# Patient Record
Sex: MALE | Race: WHITE | NOT HISPANIC OR LATINO | URBAN - METROPOLITAN AREA
[De-identification: names, ages, dates, MRNs, and addresses within clinical notes are randomized per-mention and may not be internally consistent; named-entity substitution may affect disease eponyms.]

---

## 2018-10-03 ENCOUNTER — ON CAMPUS - OUTPATIENT (AMBULATORY)
Dept: URBAN - METROPOLITAN AREA HOSPITAL 2 | Facility: HOSPITAL | Age: 78
End: 2018-10-03
Payer: MEDICARE

## 2018-10-03 VITALS
WEIGHT: 180 LBS | OXYGEN SATURATION: 99 % | HEART RATE: 93 BPM | OXYGEN SATURATION: 96 % | HEART RATE: 77 BPM | HEART RATE: 96 BPM | SYSTOLIC BLOOD PRESSURE: 110 MMHG | SYSTOLIC BLOOD PRESSURE: 161 MMHG | DIASTOLIC BLOOD PRESSURE: 75 MMHG | SYSTOLIC BLOOD PRESSURE: 135 MMHG | RESPIRATION RATE: 17 BRPM | DIASTOLIC BLOOD PRESSURE: 72 MMHG | DIASTOLIC BLOOD PRESSURE: 100 MMHG | SYSTOLIC BLOOD PRESSURE: 117 MMHG | OXYGEN SATURATION: 95 % | OXYGEN SATURATION: 100 % | RESPIRATION RATE: 20 BRPM | SYSTOLIC BLOOD PRESSURE: 151 MMHG | HEIGHT: 68 IN | SYSTOLIC BLOOD PRESSURE: 113 MMHG | HEART RATE: 99 BPM | DIASTOLIC BLOOD PRESSURE: 70 MMHG | TEMPERATURE: 97.6 F | DIASTOLIC BLOOD PRESSURE: 78 MMHG | DIASTOLIC BLOOD PRESSURE: 97 MMHG | RESPIRATION RATE: 18 BRPM | DIASTOLIC BLOOD PRESSURE: 89 MMHG | HEART RATE: 100 BPM | HEART RATE: 91 BPM | RESPIRATION RATE: 16 BRPM | SYSTOLIC BLOOD PRESSURE: 118 MMHG | SYSTOLIC BLOOD PRESSURE: 114 MMHG | DIASTOLIC BLOOD PRESSURE: 66 MMHG | SYSTOLIC BLOOD PRESSURE: 95 MMHG | HEART RATE: 76 BPM | DIASTOLIC BLOOD PRESSURE: 77 MMHG | HEART RATE: 85 BPM | DIASTOLIC BLOOD PRESSURE: 84 MMHG

## 2018-10-03 DIAGNOSIS — Z12.11 ENCOUNTER FOR SCREENING FOR MALIGNANT NEOPLASM OF COLON: ICD-10-CM

## 2018-10-03 DIAGNOSIS — K57.30 DIVERTICULOSIS OF LARGE INTESTINE WITHOUT PERFORATION OR ABS: ICD-10-CM

## 2018-10-03 PROCEDURE — G0121 COLON CA SCRN NOT HI RSK IND: HCPCS | Performed by: INTERNAL MEDICINE

## 2022-12-08 ENCOUNTER — TRANSCRIBE ORDERS (OUTPATIENT)
Dept: ADMINISTRATIVE | Facility: HOSPITAL | Age: 82
End: 2022-12-08

## 2022-12-08 DIAGNOSIS — I65.22 OCCLUSION AND STENOSIS OF LEFT CAROTID ARTERY: Primary | ICD-10-CM

## 2022-12-12 ENCOUNTER — APPOINTMENT (OUTPATIENT)
Dept: CARDIOLOGY | Facility: HOSPITAL | Age: 82
End: 2022-12-12

## 2022-12-13 ENCOUNTER — HOSPITAL ENCOUNTER (OUTPATIENT)
Dept: CARDIOLOGY | Facility: HOSPITAL | Age: 82
Discharge: HOME | End: 2022-12-13
Admitting: PSYCHIATRY & NEUROLOGY

## 2022-12-13 DIAGNOSIS — I65.22 OCCLUSION AND STENOSIS OF LEFT CAROTID ARTERY: ICD-10-CM

## 2022-12-13 LAB
BH CV LEFT CCA HIDDEN LRR: 1 CM/S
BH CV VAS CAROTID LEFT DISTAL STENT EDV: 12 CM/S
BH CV VAS CAROTID LEFT DISTAL STENT: 54 CM/S
BH CV VAS CAROTID LEFT DISTAL TO STENT EDV: 21 CM/S
BH CV VAS CAROTID LEFT DISTAL TO STENT: 71 CM/S
BH CV VAS CAROTID LEFT MID STENT EDV: 22 CM/S
BH CV VAS CAROTID LEFT MID STENT: 85 CM/S
BH CV VAS CAROTID LEFT PROXIMAL STENT EDV: 22 CM/S
BH CV VAS CAROTID LEFT PROXIMAL STENT: 86 CM/S
BH CV VAS CAROTID LEFT PROXIMAL TO STENT: 76 CM/S
BH CV VAS CAROTID LEFT STENT NATIVE VESSEL PROXIMAL ED: 19 CM/S
BH CV XLRA MEAS LEFT DIST CCA EDV: -22.4 CM/SEC
BH CV XLRA MEAS LEFT DIST CCA PSV: -86.4 CM/SEC
BH CV XLRA MEAS LEFT DIST ICA EDV: -16.4 CM/SEC
BH CV XLRA MEAS LEFT DIST ICA PSV: -71.1 CM/SEC
BH CV XLRA MEAS LEFT ICA/CCA RATIO: 0.94
BH CV XLRA MEAS LEFT MID CCA EDV: -18.6 CM/SEC
BH CV XLRA MEAS LEFT MID CCA PSV: -75.8 CM/SEC
BH CV XLRA MEAS LEFT MID ICA EDV: -19.9 CM/SEC
BH CV XLRA MEAS LEFT MID ICA PSV: -81.4 CM/SEC
BH CV XLRA MEAS LEFT PROX CCA EDV: 14.3 CM/SEC
BH CV XLRA MEAS LEFT PROX CCA PSV: 76.4 CM/SEC
BH CV XLRA MEAS LEFT PROX ECA PSV: -176 CM/SEC
BH CV XLRA MEAS LEFT PROX ICA EDV: -11.8 CM/SEC
BH CV XLRA MEAS LEFT PROX ICA PSV: -54 CM/SEC
BH CV XLRA MEAS LEFT PROX SCLA PSV: 103 CM/SEC
BH CV XLRA MEAS RIGHT DIST CCA EDV: 18 CM/SEC
BH CV XLRA MEAS RIGHT DIST CCA PSV: 67.1 CM/SEC
BH CV XLRA MEAS RIGHT DIST ICA EDV: -19.2 CM/SEC
BH CV XLRA MEAS RIGHT DIST ICA PSV: -68.6 CM/SEC
BH CV XLRA MEAS RIGHT ICA/CCA RATIO: -1.24
BH CV XLRA MEAS RIGHT PROX CCA EDV: 12.4 CM/SEC
BH CV XLRA MEAS RIGHT PROX CCA PSV: 64.6 CM/SEC
BH CV XLRA MEAS RIGHT PROX ECA PSV: -149 CM/SEC
BH CV XLRA MEAS RIGHT PROX ICA EDV: -15.9 CM/SEC
BH CV XLRA MEAS RIGHT PROX ICA PSV: -83.4 CM/SEC
BH CV XLRA MEAS RIGHT PROX SCLA PSV: 113 CM/SEC
BH CV XLRA MEAS RIGHT VERTEBRAL A EDV: -14.8 CM/SEC
BH CV XLRA MEAS RIGHT VERTEBRAL A PSV: -49.4 CM/SEC
BH CVPROX LEFT ICA HIDDEN LRR: 1 CM
MAXIMAL PREDICTED HEART RATE: 140 BPM
STRESS TARGET HR: 119 BPM

## 2022-12-13 PROCEDURE — 93880 EXTRACRANIAL BILAT STUDY: CPT

## 2025-04-23 ENCOUNTER — HOSPITAL ENCOUNTER (OUTPATIENT)
Facility: HOSPITAL | Age: 85
Discharge: HOME | End: 2025-04-23
Attending: EMERGENCY MEDICINE | Admitting: EMERGENCY MEDICINE
Payer: MEDICARE

## 2025-04-23 ENCOUNTER — APPOINTMENT (OUTPATIENT)
Dept: GENERAL RADIOLOGY | Facility: HOSPITAL | Age: 85
End: 2025-04-23
Payer: MEDICARE

## 2025-04-23 VITALS
BODY MASS INDEX: 26.92 KG/M2 | WEIGHT: 177.6 LBS | TEMPERATURE: 98.6 F | RESPIRATION RATE: 18 BRPM | DIASTOLIC BLOOD PRESSURE: 65 MMHG | HEART RATE: 79 BPM | OXYGEN SATURATION: 98 % | SYSTOLIC BLOOD PRESSURE: 112 MMHG | HEIGHT: 68 IN

## 2025-04-23 DIAGNOSIS — L03.114 CELLULITIS OF ARM, LEFT: Primary | ICD-10-CM

## 2025-04-23 PROCEDURE — 73110 X-RAY EXAM OF WRIST: CPT

## 2025-04-23 PROCEDURE — G0463 HOSPITAL OUTPT CLINIC VISIT: HCPCS | Performed by: NURSE PRACTITIONER

## 2025-04-23 PROCEDURE — 73090 X-RAY EXAM OF FOREARM: CPT

## 2025-04-23 PROCEDURE — 99203 OFFICE O/P NEW LOW 30 MIN: CPT | Performed by: NURSE PRACTITIONER

## 2025-04-23 RX ORDER — CLINDAMYCIN HYDROCHLORIDE 300 MG/1
300 CAPSULE ORAL 4 TIMES DAILY
Qty: 40 CAPSULE | Refills: 0 | Status: SHIPPED | OUTPATIENT
Start: 2025-04-23 | End: 2025-05-03

## 2025-04-23 NOTE — DISCHARGE INSTRUCTIONS
Call tomorrow  for a follow up appointment with your primary care for for reevaluation and further treatment.     Medications as prescribed.    Elevate your arm to help with swelling    If you develop increased fevers, increased swelling and redness you need to be reevaluated immediately.    Tylenol/Motrin as needed for pain/fevers    Make sure patient is drinking plenty of fluids.    Return for any new or worsening symptoms.      Voice recognition transcription technology was used for documentation on this chart.  Result there may be some typos and/or introduced into the chart that were overlooked during editing reviewing.

## 2025-04-23 NOTE — FSED PROVIDER NOTE
"Subjective   History of Present Illness  The patient is an 84-year-old male who presents to the ER with swelling and \"fever\" in his left arm that started Sunday night.  Patient reports that the pain actually started in the left wrist and has gradually gotten worse.  Patient denies any injury or trauma.    History provided by:  Patient   used: No        Review of Systems   Musculoskeletal:         Patient with left arm pain, swelling       Past Medical History:   Diagnosis Date    Coronary artery disease        No Known Allergies    Past Surgical History:   Procedure Laterality Date    CORONARY STENT PLACEMENT  01/01/2020       History reviewed. No pertinent family history.    Social History     Socioeconomic History    Marital status: Single           Objective   Physical Exam  Vitals and nursing note reviewed.   Constitutional:       Appearance: Normal appearance.   HENT:      Head: Normocephalic.   Musculoskeletal:         General: Swelling and tenderness present. Normal range of motion.   Skin:     General: Skin is warm and dry.      Findings: Erythema present.      Comments: Patient with left forearm/wrist pain, swelling and erythema. Patient with sensation intact, positive radial pulses noted, CMS intact.    Neurological:      General: No focal deficit present.      Mental Status: He is alert and oriented to person, place, and time.   Psychiatric:         Mood and Affect: Mood normal.         Behavior: Behavior normal. Behavior is cooperative.         Procedures           ED Course  ED Course as of 04/23/25 1837   Wed Apr 23, 2025   1800 XR FOREARM 2 VW LEFT, XR WRIST 3+ VW LEFT     Date of Exam: 4/23/2025 5:05 PM EDT     Indication: pain     Comparison: None available.     Findings:  Left forearm: There is some subcutaneous edema at the proximal mid forearm. No soft tissue gas or radiopaque foreign body. No elbow joint effusion. No acute fracture or malalignment.     Left wrist: No acute " "fracture or traumatic malalignment. The carpal bones and carpal arcs appear unremarkable.     IMPRESSION:  Impression:  Mild subcutaneous fat stranding of the proximal and mid forearm soft tissues. No elbow joint effusion. No acute fracture or malalignment.      [DS]      ED Course User Index  [DS] Alis Goodson APRN                                           Medical Decision Making  The patient is an 84-year-old male who presents to the ER with swelling and \"fever\" in his left arm that started Sunday night.  Patient reports that the pain actually started in the left wrist and has gradually gotten worse. Patient with left forearm/wrist pain, swelling and erythema. Patient with sensation intact, positive radial pulses noted, CMS intact.  X-ray shows mild subcu fat stranding of the proximal mid forearm of the soft tissue no elbow joint effusion no acute fracture or malalignment noted. This patient presents with initial presentation of local erythema, warmth, swelling concerning for cellulitis. Sensitivity/pain to light touch around the erythematous area. No lymphangitic spread visible and no fluid pockets or fluctuance concerning for abscess noted. Low concern for osteomyelitis or DVT. No immune compromise, bullae, pain out of proportion, or rapid progression concerning for necrotizing fasciitis. Patient to be discharged home with clindamycin with follow up with their PMD, or if he develops increased fevers, increased redness and swelling of the left forearm he needs to be reevaluated immediately in the ER.  Patient verbalized understanding and is in agreement with this plan of care        Problems Addressed:  Cellulitis of arm, left: complicated acute illness or injury    Amount and/or Complexity of Data Reviewed  Radiology: ordered. Decision-making details documented in ED Course.    Risk  Prescription drug management.        Final diagnoses:   Cellulitis of arm, left       ED Disposition  ED Disposition  "      ED Disposition   Discharge    Condition   Stable    Comment   --               Jorge Morales MD  [address] [place] [address] [place] [address] [phone]    Schedule an appointment as soon as possible for a visit in 1 week  for follow up and re-evaluation.         Medication List        New Prescriptions      clindamycin 300 MG capsule  Commonly known as: CLEOCIN  Take 1 capsule by mouth 4 (Four) Times a Day for 10 days.               Where to Get Your Medications        These medications were sent to [facility] [address] [place] [address], [place] - [address] [facility] - [phone]  - [phone]   [address] [facility] [place] [address]      Phone: [phone]   clindamycin 300 MG capsule

## 2025-06-28 ENCOUNTER — HOSPITAL ENCOUNTER (EMERGENCY)
Facility: HOSPITAL | Age: 85
Discharge: HOME | End: 2025-06-28
Attending: EMERGENCY MEDICINE
Payer: MEDICARE

## 2025-06-28 ENCOUNTER — APPOINTMENT (OUTPATIENT)
Dept: GENERAL RADIOLOGY | Facility: HOSPITAL | Age: 85
End: 2025-06-28
Payer: MEDICARE

## 2025-06-28 VITALS
DIASTOLIC BLOOD PRESSURE: 51 MMHG | OXYGEN SATURATION: 97 % | HEART RATE: 81 BPM | WEIGHT: 176.6 LBS | TEMPERATURE: 98.1 F | RESPIRATION RATE: 18 BRPM | SYSTOLIC BLOOD PRESSURE: 115 MMHG | BODY MASS INDEX: 26.76 KG/M2 | HEIGHT: 68 IN

## 2025-06-28 DIAGNOSIS — L03.113 CELLULITIS OF RIGHT ARM: Primary | ICD-10-CM

## 2025-06-28 LAB
ANION GAP SERPL CALCULATED.3IONS-SCNC: 9.4 MMOL/L (ref 5–15)
BASOPHILS # BLD AUTO: 0.02 10*3/MM3 (ref 0–0.2)
BASOPHILS NFR BLD AUTO: 0.2 % (ref 0–1.5)
BUN SERPL-MCNC: 18.9 MG/DL (ref 8–23)
BUN/CREAT SERPL: 16.7 (ref 7–25)
CALCIUM SPEC-SCNC: 9.7 MG/DL (ref 8.6–10.5)
CHLORIDE SERPL-SCNC: 103 MMOL/L (ref 98–107)
CO2 SERPL-SCNC: 24.6 MMOL/L (ref 22–29)
CREAT SERPL-MCNC: 1.13 MG/DL (ref 0.76–1.27)
CRP SERPL-MCNC: 2.34 MG/DL (ref 0–0.5)
DEPRECATED RDW RBC AUTO: 48.5 FL (ref 37–54)
EGFRCR SERPLBLD CKD-EPI 2021: 64.9 ML/MIN/1.73
EOSINOPHIL # BLD AUTO: 0.04 10*3/MM3 (ref 0–0.4)
EOSINOPHIL NFR BLD AUTO: 0.4 % (ref 0.3–6.2)
ERYTHROCYTE [DISTWIDTH] IN BLOOD BY AUTOMATED COUNT: 13.5 % (ref 12.3–15.4)
ERYTHROCYTE [SEDIMENTATION RATE] IN BLOOD: 24 MM/HR (ref 0–20)
GLUCOSE SERPL-MCNC: 212 MG/DL (ref 65–99)
HCT VFR BLD AUTO: 35.1 % (ref 37.5–51)
HGB BLD-MCNC: 11 G/DL (ref 13–17.7)
IMM GRANULOCYTES # BLD AUTO: 0.02 10*3/MM3 (ref 0–0.05)
IMM GRANULOCYTES NFR BLD AUTO: 0.2 % (ref 0–0.5)
LYMPHOCYTES # BLD AUTO: 2.04 10*3/MM3 (ref 0.7–3.1)
LYMPHOCYTES NFR BLD AUTO: 19.8 % (ref 19.6–45.3)
MCH RBC QN AUTO: 30.6 PG (ref 26.6–33)
MCHC RBC AUTO-ENTMCNC: 31.3 G/DL (ref 31.5–35.7)
MCV RBC AUTO: 97.8 FL (ref 79–97)
MONOCYTES # BLD AUTO: 0.88 10*3/MM3 (ref 0.1–0.9)
MONOCYTES NFR BLD AUTO: 8.6 % (ref 5–12)
NEUTROPHILS NFR BLD AUTO: 7.28 10*3/MM3 (ref 1.7–7)
NEUTROPHILS NFR BLD AUTO: 70.8 % (ref 42.7–76)
PLATELET # BLD AUTO: 167 10*3/MM3 (ref 140–450)
PMV BLD AUTO: 10.9 FL (ref 6–12)
POTASSIUM SERPL-SCNC: 3.8 MMOL/L (ref 3.5–5.2)
RBC # BLD AUTO: 3.59 10*6/MM3 (ref 4.14–5.8)
SODIUM SERPL-SCNC: 137 MMOL/L (ref 136–145)
WBC NRBC COR # BLD AUTO: 10.28 10*3/MM3 (ref 3.4–10.8)

## 2025-06-28 PROCEDURE — 86140 C-REACTIVE PROTEIN: CPT | Performed by: EMERGENCY MEDICINE

## 2025-06-28 PROCEDURE — 25010000002 CLINDAMYCIN PER 300 MG: Performed by: EMERGENCY MEDICINE

## 2025-06-28 PROCEDURE — 85025 COMPLETE CBC W/AUTO DIFF WBC: CPT | Performed by: EMERGENCY MEDICINE

## 2025-06-28 PROCEDURE — 73090 X-RAY EXAM OF FOREARM: CPT

## 2025-06-28 PROCEDURE — 85652 RBC SED RATE AUTOMATED: CPT | Performed by: EMERGENCY MEDICINE

## 2025-06-28 PROCEDURE — 96365 THER/PROPH/DIAG IV INF INIT: CPT

## 2025-06-28 PROCEDURE — 99283 EMERGENCY DEPT VISIT LOW MDM: CPT

## 2025-06-28 PROCEDURE — 80048 BASIC METABOLIC PNL TOTAL CA: CPT | Performed by: EMERGENCY MEDICINE

## 2025-06-28 RX ORDER — CLINDAMYCIN PHOSPHATE 600 MG/50ML
600 INJECTION, SOLUTION INTRAVENOUS ONCE
Status: COMPLETED | OUTPATIENT
Start: 2025-06-28 | End: 2025-06-28

## 2025-06-28 RX ORDER — IBUPROFEN 800 MG/1
800 TABLET, FILM COATED ORAL EVERY 8 HOURS PRN
Qty: 30 TABLET | Refills: 0 | Status: SHIPPED | OUTPATIENT
Start: 2025-06-28

## 2025-06-28 RX ADMIN — CLINDAMYCIN PHOSPHATE 600 MG: 600 INJECTION, SOLUTION INTRAVENOUS at 09:40

## 2025-06-28 NOTE — FSED PROVIDER NOTE
Subjective   History of Present Illness  Patient with complaint of right arm and hand pain and swelling for several days. Patient states he was scratched and bit by a cat several days ago to right arm. Noted swelling last night. Patient right hand dominant. No pattern to symptoms. No precipitating or rleieving factors. No other complaints.     History provided by:  Patient   used: No        Review of Systems   All other systems reviewed and are negative.      Past Medical History:   Diagnosis Date    Coronary artery disease        No Known Allergies    Past Surgical History:   Procedure Laterality Date    CORONARY STENT PLACEMENT  01/01/2020       History reviewed. No pertinent family history.    Social History     Socioeconomic History    Marital status: Single           Objective   Physical Exam  Vitals and nursing note reviewed.   Constitutional:       Appearance: Normal appearance.   Musculoskeletal:         General: Swelling present. No tenderness, deformity or signs of injury. Normal range of motion.      Comments: Circumferential Swelling distal to right elbow extending to hands with mild erythema. Punctate lesions along proximal area of right forearm. No prurulent discharge or induration.    Skin:     General: Skin is warm and dry.      Capillary Refill: Capillary refill takes less than 2 seconds.      Findings: Lesion present.   Neurological:      General: No focal deficit present.      Mental Status: He is alert.   Psychiatric:         Mood and Affect: Mood normal.         Behavior: Behavior normal.         Procedures           ED Course                                           Medical Decision Making  Concern for right arm cellulitis s/p cat bite/scratch. D/w patient.sri thompson.    Problems Addressed:  Cellulitis of right arm: complicated acute illness or injury    Amount and/or Complexity of Data Reviewed  Labs: ordered.  Radiology: ordered.    Risk  Prescription drug  management.        Final diagnoses:   Cellulitis of right arm       ED Disposition  ED Disposition       ED Disposition   Discharge    Condition   Stable    Comment   --               Jorge Morales MD  [address] [place] [address] [phone]    Schedule an appointment as soon as possible for a visit            Medication List        New Prescriptions      amoxicillin-clavulanate 875-125 MG per tablet  Commonly known as: AUGMENTIN  Take 1 tablet by mouth 2 (Two) Times a Day for 10 days.     ibuprofen 800 MG tablet  Commonly known as: ADVIL,MOTRIN  Take 1 tablet by mouth Every 8 (Eight) Hours As Needed for Mild Pain or Moderate Pain.               Where to Get Your Medications        These medications were sent to [facility] [address] [place] [address], [place] - [address] [facility] - [phone]  - [phone]   [address] [facility] [place] [address]      Phone: [phone]   amoxicillin-clavulanate 875-125 MG per tablet  ibuprofen 800 MG tablet